# Patient Record
Sex: MALE | Race: WHITE
[De-identification: names, ages, dates, MRNs, and addresses within clinical notes are randomized per-mention and may not be internally consistent; named-entity substitution may affect disease eponyms.]

---

## 2018-02-03 ENCOUNTER — TRANSCRIPTION ENCOUNTER (OUTPATIENT)
Age: 68
End: 2018-02-03

## 2018-08-08 PROBLEM — Z00.00 ENCOUNTER FOR PREVENTIVE HEALTH EXAMINATION: Status: ACTIVE | Noted: 2018-08-08

## 2019-01-16 ENCOUNTER — RECORD ABSTRACTING (OUTPATIENT)
Age: 69
End: 2019-01-16

## 2019-01-16 DIAGNOSIS — Z86.39 PERSONAL HISTORY OF OTHER ENDOCRINE, NUTRITIONAL AND METABOLIC DISEASE: ICD-10-CM

## 2019-01-16 DIAGNOSIS — Z83.3 FAMILY HISTORY OF DIABETES MELLITUS: ICD-10-CM

## 2019-01-16 DIAGNOSIS — Z78.9 OTHER SPECIFIED HEALTH STATUS: ICD-10-CM

## 2019-01-16 DIAGNOSIS — Z80.9 FAMILY HISTORY OF MALIGNANT NEOPLASM, UNSPECIFIED: ICD-10-CM

## 2019-01-19 ENCOUNTER — TRANSCRIPTION ENCOUNTER (OUTPATIENT)
Age: 69
End: 2019-01-19

## 2019-01-24 ENCOUNTER — APPOINTMENT (OUTPATIENT)
Dept: ENDOCRINOLOGY | Facility: CLINIC | Age: 69
End: 2019-01-24
Payer: MEDICARE

## 2019-01-24 VITALS
BODY MASS INDEX: 27.13 KG/M2 | HEART RATE: 70 BPM | SYSTOLIC BLOOD PRESSURE: 120 MMHG | HEIGHT: 68 IN | OXYGEN SATURATION: 97 % | WEIGHT: 179 LBS | DIASTOLIC BLOOD PRESSURE: 70 MMHG

## 2019-01-24 PROCEDURE — 76536 US EXAM OF HEAD AND NECK: CPT

## 2019-01-24 PROCEDURE — 99214 OFFICE O/P EST MOD 30 MIN: CPT | Mod: 25

## 2019-07-19 ENCOUNTER — CHART COPY (OUTPATIENT)
Age: 69
End: 2019-07-19

## 2019-07-19 LAB
GLUCOSE SERPL-MCNC: 110
HBA1C MFR BLD HPLC: 5.8
LDLC SERPL DIRECT ASSAY-MCNC: 82

## 2019-07-22 ENCOUNTER — APPOINTMENT (OUTPATIENT)
Dept: ENDOCRINOLOGY | Facility: CLINIC | Age: 69
End: 2019-07-22
Payer: MEDICARE

## 2019-07-22 VITALS
HEIGHT: 68 IN | OXYGEN SATURATION: 98 % | DIASTOLIC BLOOD PRESSURE: 70 MMHG | HEART RATE: 70 BPM | SYSTOLIC BLOOD PRESSURE: 132 MMHG | WEIGHT: 183 LBS | BODY MASS INDEX: 27.74 KG/M2

## 2019-07-22 PROCEDURE — 99214 OFFICE O/P EST MOD 30 MIN: CPT

## 2019-07-22 RX ORDER — PRAVASTATIN SODIUM 80 MG/1
80 TABLET ORAL DAILY
Refills: 0 | Status: ACTIVE | COMMUNITY

## 2019-07-22 RX ORDER — CHOLECALCIFEROL (VITAMIN D3) 50 MCG
2000 CAPSULE ORAL
Refills: 0 | Status: ACTIVE | COMMUNITY

## 2020-01-30 ENCOUNTER — APPOINTMENT (OUTPATIENT)
Dept: ENDOCRINOLOGY | Facility: CLINIC | Age: 70
End: 2020-01-30
Payer: MEDICARE

## 2020-01-30 VITALS
BODY MASS INDEX: 28.49 KG/M2 | HEIGHT: 68 IN | DIASTOLIC BLOOD PRESSURE: 78 MMHG | HEART RATE: 60 BPM | OXYGEN SATURATION: 98 % | WEIGHT: 188 LBS | SYSTOLIC BLOOD PRESSURE: 120 MMHG

## 2020-01-30 PROCEDURE — 99214 OFFICE O/P EST MOD 30 MIN: CPT

## 2020-01-30 PROCEDURE — 76536 US EXAM OF HEAD AND NECK: CPT

## 2020-02-07 ENCOUNTER — TRANSCRIPTION ENCOUNTER (OUTPATIENT)
Age: 70
End: 2020-02-07

## 2020-07-29 ENCOUNTER — RX RENEWAL (OUTPATIENT)
Age: 70
End: 2020-07-29

## 2020-07-31 ENCOUNTER — APPOINTMENT (OUTPATIENT)
Dept: ENDOCRINOLOGY | Facility: CLINIC | Age: 70
End: 2020-07-31
Payer: MEDICARE

## 2020-07-31 VITALS
HEART RATE: 62 BPM | DIASTOLIC BLOOD PRESSURE: 90 MMHG | SYSTOLIC BLOOD PRESSURE: 140 MMHG | WEIGHT: 185 LBS | OXYGEN SATURATION: 98 % | HEIGHT: 68 IN | BODY MASS INDEX: 28.04 KG/M2

## 2020-07-31 PROCEDURE — 99214 OFFICE O/P EST MOD 30 MIN: CPT

## 2020-10-07 ENCOUNTER — TRANSCRIPTION ENCOUNTER (OUTPATIENT)
Age: 70
End: 2020-10-07

## 2020-12-28 ENCOUNTER — RX RENEWAL (OUTPATIENT)
Age: 70
End: 2020-12-28

## 2021-01-20 LAB
HBA1C MFR BLD HPLC: 6
LDLC SERPL DIRECT ASSAY-MCNC: 28

## 2021-01-21 ENCOUNTER — APPOINTMENT (OUTPATIENT)
Dept: ENDOCRINOLOGY | Facility: CLINIC | Age: 71
End: 2021-01-21
Payer: MEDICARE

## 2021-01-21 VITALS
WEIGHT: 190 LBS | DIASTOLIC BLOOD PRESSURE: 80 MMHG | HEART RATE: 58 BPM | BODY MASS INDEX: 28.79 KG/M2 | SYSTOLIC BLOOD PRESSURE: 132 MMHG | OXYGEN SATURATION: 98 % | HEIGHT: 68 IN

## 2021-01-21 PROCEDURE — 99214 OFFICE O/P EST MOD 30 MIN: CPT

## 2021-01-21 PROCEDURE — 76536 US EXAM OF HEAD AND NECK: CPT

## 2021-01-21 PROCEDURE — 99072 ADDL SUPL MATRL&STAF TM PHE: CPT

## 2021-01-26 NOTE — PROCEDURE
[PayTouch e 2008 model, 10-12 MHz frequencies] : multiple real time longitudinal and transverse images were obtained using a high resolution ultrasound with a linear transducer, PayTouch e 2008 model, 10-12 MHz frequencies. All measurements will be reported as longitudinal x anika-posterior x transverse. [Report dated ___] : Report dated [unfilled] [Multinodular Goiter] : multinodular goiter [Post-Thyroidectomy] : post-thyroidectomy [Isoechoic] : isoechoic nodule [Mid] : mid pole there is a  [Mixed] : mixed [Left Thyroid] : left [Cyst] : cyst [Round] : round in shape [Smooth] : smooth [Regular] : regular [No vascularity] : no vascularity [FreeTextEntry5] : 4.3x1.5x1.2 [FreeTextEntry3] : 0.1x0.1x0.1 [FreeTextEntry4] : smaller

## 2021-01-26 NOTE — IMPRESSION
[FreeTextEntry1] : MNG- subcentimeter nodules, but different in appearance [FreeTextEntry2] : repeat in 6months, if stable, can repeat annually

## 2021-07-22 ENCOUNTER — APPOINTMENT (OUTPATIENT)
Dept: ENDOCRINOLOGY | Facility: CLINIC | Age: 71
End: 2021-07-22
Payer: MEDICARE

## 2021-07-22 VITALS
DIASTOLIC BLOOD PRESSURE: 64 MMHG | SYSTOLIC BLOOD PRESSURE: 122 MMHG | HEART RATE: 60 BPM | HEIGHT: 68 IN | WEIGHT: 187 LBS | BODY MASS INDEX: 28.34 KG/M2

## 2021-07-22 DIAGNOSIS — R73.03 PREDIABETES.: ICD-10-CM

## 2021-07-22 DIAGNOSIS — E03.9 HYPOTHYROIDISM, UNSPECIFIED: ICD-10-CM

## 2021-07-22 DIAGNOSIS — E04.2 NONTOXIC MULTINODULAR GOITER: ICD-10-CM

## 2021-07-22 DIAGNOSIS — E78.5 HYPERLIPIDEMIA, UNSPECIFIED: ICD-10-CM

## 2021-07-22 DIAGNOSIS — E55.9 VITAMIN D DEFICIENCY, UNSPECIFIED: ICD-10-CM

## 2021-07-22 PROCEDURE — 76536 US EXAM OF HEAD AND NECK: CPT

## 2021-07-22 PROCEDURE — 99214 OFFICE O/P EST MOD 30 MIN: CPT

## 2021-07-22 PROCEDURE — 99072 ADDL SUPL MATRL&STAF TM PHE: CPT

## 2021-07-25 ENCOUNTER — NON-APPOINTMENT (OUTPATIENT)
Age: 71
End: 2021-07-25

## 2021-07-26 NOTE — PROCEDURE
[[employer] e 2008 model, 10-12 MHz frequencies] : multiple real time longitudinal and transverse images were obtained using a high resolution ultrasound with a linear transducer, [employer] e 2008 model, 10-12 MHz frequencies. All measurements will be reported as longitudinal x anika-posterior x transverse. [Report dated ___] : Report dated [unfilled] [Mid] : mid pole there is a  [Mixed] : mixed [Ovoid] : ovoid in shape [Smooth] : smooth [No calcification] : no calcification [Left Thyroid] : left [Lower] : lower pole there is a  [Cyst] : cyst [Round] : round in shape [No vascularity] : no vascularity [FreeTextEntry5] : 4.5x1.7x1.2 [FreeTextEntry3] : 0.2

## 2021-07-26 NOTE — IMPRESSION
[FreeTextEntry1] : MNG- s/p r thyoidectomy and isthusmectomy, subcm thyroid nodules. prior LMP isoechoic nodule not seen [FreeTextEntry2] : repeat ultrasound in 1 year

## 2021-11-17 ENCOUNTER — RX RENEWAL (OUTPATIENT)
Age: 71
End: 2021-11-17

## 2021-11-23 ENCOUNTER — TRANSCRIPTION ENCOUNTER (OUTPATIENT)
Age: 71
End: 2021-11-23

## 2021-11-23 RX ORDER — LEVOTHYROXINE SODIUM 0.1 MG/1
100 TABLET ORAL
Qty: 90 | Refills: 3 | Status: ACTIVE | COMMUNITY
Start: 2020-07-29 | End: 1900-01-01

## 2021-12-20 ENCOUNTER — TRANSCRIPTION ENCOUNTER (OUTPATIENT)
Age: 71
End: 2021-12-20

## 2022-01-20 ENCOUNTER — APPOINTMENT (OUTPATIENT)
Dept: ENDOCRINOLOGY | Facility: CLINIC | Age: 72
End: 2022-01-20

## 2022-06-14 ENCOUNTER — NEW PATIENT (OUTPATIENT)
Dept: URBAN - METROPOLITAN AREA CLINIC 43 | Facility: CLINIC | Age: 72
End: 2022-06-14

## 2022-06-14 DIAGNOSIS — H52.7: ICD-10-CM

## 2022-06-14 DIAGNOSIS — H25.811: ICD-10-CM

## 2022-06-14 DIAGNOSIS — Z96.1: ICD-10-CM

## 2022-06-14 DIAGNOSIS — Z98.890: ICD-10-CM

## 2022-06-14 PROCEDURE — 92015 DETERMINE REFRACTIVE STATE: CPT

## 2022-06-14 PROCEDURE — 92004 COMPRE OPH EXAM NEW PT 1/>: CPT

## 2022-06-14 PROCEDURE — 99199RRD RESIDENT RENDERING PROVIDER

## 2022-06-14 ASSESSMENT — VISUAL ACUITY
OD_SC: J1
OD_SC: 20/70-1
OD_CC: 20/30
OS_CC: 20/30-2
OD_CC: J1
OS_SC: J2
OS_SC: 20/150
OS_CC: J1

## 2022-06-14 ASSESSMENT — TONOMETRY
OS_IOP_MMHG: 19
OD_IOP_MMHG: 18

## 2023-06-16 ENCOUNTER — COMPREHENSIVE EXAM (OUTPATIENT)
Dept: URBAN - METROPOLITAN AREA CLINIC 43 | Facility: CLINIC | Age: 73
End: 2023-06-16

## 2023-06-16 DIAGNOSIS — Z01.00: ICD-10-CM

## 2023-06-16 DIAGNOSIS — H52.7: ICD-10-CM

## 2023-06-16 PROCEDURE — 92015 DETERMINE REFRACTIVE STATE: CPT

## 2023-06-16 PROCEDURE — 92014 COMPRE OPH EXAM EST PT 1/>: CPT

## 2023-06-16 ASSESSMENT — VISUAL ACUITY
OS_CC: 20/20
OS_SC: 20/200
OD_CC: 20/20
OS_CC: J1
OD_SC: J2
OS_SC: J1-
OD_SC: 20/70-2
OD_CC: J1

## 2023-06-16 ASSESSMENT — TONOMETRY
OS_IOP_MMHG: 17
OD_IOP_MMHG: 13

## 2024-06-21 ENCOUNTER — COMPREHENSIVE EXAM (OUTPATIENT)
Dept: URBAN - METROPOLITAN AREA CLINIC 43 | Facility: CLINIC | Age: 74
End: 2024-06-21

## 2024-06-21 DIAGNOSIS — Z01.00: ICD-10-CM

## 2024-06-21 PROCEDURE — 92015GRNC REFRACTION GLASSES RECHECK - NO CHARGE

## 2024-06-21 ASSESSMENT — VISUAL ACUITY
OD_SC: J3
OD_SC: 20/60-1
OD_CC: J1
OD_CC: 20/20-1
OS_SC: 20/80-1
OS_SC: J3
OS_CC: J2
OS_CC: 20/30+2

## 2024-06-21 ASSESSMENT — TONOMETRY
OS_IOP_MMHG: 19
OD_IOP_MMHG: 15

## 2024-08-19 ENCOUNTER — CONSULTATION/EVALUATION (OUTPATIENT)
Dept: URBAN - METROPOLITAN AREA CLINIC 43 | Facility: CLINIC | Age: 74
End: 2024-08-19

## 2024-08-19 DIAGNOSIS — H25.811: ICD-10-CM

## 2024-08-19 DIAGNOSIS — H04.123: ICD-10-CM

## 2024-08-19 DIAGNOSIS — Z96.1: ICD-10-CM

## 2024-08-19 DIAGNOSIS — Z98.890: ICD-10-CM

## 2024-08-19 PROCEDURE — 92025-1 CORNEAL TOPOGRAPHY, INS

## 2024-08-19 PROCEDURE — 92134 CPTRZ OPH DX IMG PST SGM RTA: CPT | Mod: NC

## 2024-08-19 PROCEDURE — 92286 ANT SGM IMG I&R SPECLR MIC: CPT | Mod: NC

## 2024-08-19 PROCEDURE — V2799PMN IMPRIMIS PRED-MOXI-NEPAF 5ML

## 2024-08-19 PROCEDURE — 99214 OFFICE O/P EST MOD 30 MIN: CPT

## 2024-08-19 PROCEDURE — 92136 OPHTHALMIC BIOMETRY: CPT

## 2024-08-19 ASSESSMENT — VISUAL ACUITY
OS_SC: J2
OD_SC: 20/40
OD_CC: J2
OS_CC: 20/25+1
OD_BAT: 20/80
OD_CC: 20/20-1
OS_SC: 20/200
OS_CC: J2
OD_SC: J4
OS_BAT: 20/40

## 2024-08-19 ASSESSMENT — TONOMETRY
OS_IOP_MMHG: 15
OD_IOP_MMHG: 15

## 2024-09-27 ENCOUNTER — SURGERY/PROCEDURE (OUTPATIENT)
Facility: LOCATION | Age: 74
End: 2024-09-27

## 2024-09-27 ENCOUNTER — TECH ONLY (OUTPATIENT)
Facility: LOCATION | Age: 74
End: 2024-09-27

## 2024-09-27 ENCOUNTER — PRE-OP/H&P (OUTPATIENT)
Facility: LOCATION | Age: 74
End: 2024-09-27

## 2024-09-27 DIAGNOSIS — H04.123: ICD-10-CM

## 2024-09-27 DIAGNOSIS — H25.811: ICD-10-CM

## 2024-09-27 DIAGNOSIS — Z96.1: ICD-10-CM

## 2024-09-27 PROCEDURE — 66984CV REMOVE CATARACT, INSERT LENS, CUSTOM VISION

## 2024-09-27 PROCEDURE — 66999LNSR LENSAR LASER FOR CAT SX

## 2024-09-27 PROCEDURE — 99199PCV PROF CUSTOM VISION PACKAGE

## 2024-09-27 PROCEDURE — 99211T TECH SERVICE

## 2024-09-27 PROCEDURE — 99211HP PRE-OP

## 2024-09-30 ENCOUNTER — POST-OP (OUTPATIENT)
Dept: URBAN - METROPOLITAN AREA CLINIC 43 | Facility: CLINIC | Age: 74
End: 2024-09-30

## 2024-09-30 DIAGNOSIS — Z96.1: ICD-10-CM

## 2024-10-14 ENCOUNTER — POST-OP (OUTPATIENT)
Dept: URBAN - METROPOLITAN AREA CLINIC 43 | Facility: CLINIC | Age: 74
End: 2024-10-14

## 2024-10-14 DIAGNOSIS — Z96.1: ICD-10-CM

## 2024-10-14 PROCEDURE — 99024 POSTOP FOLLOW-UP VISIT: CPT

## 2025-03-26 ENCOUNTER — COMPREHENSIVE EXAM (OUTPATIENT)
Age: 75
End: 2025-03-26

## 2025-03-26 DIAGNOSIS — H52.7: ICD-10-CM

## 2025-03-26 DIAGNOSIS — Z01.00: ICD-10-CM

## 2025-03-26 PROCEDURE — 92015 DETERMINE REFRACTIVE STATE: CPT

## 2025-03-26 PROCEDURE — 92014 COMPRE OPH EXAM EST PT 1/>: CPT
